# Patient Record
(demographics unavailable — no encounter records)

---

## 2024-12-10 NOTE — PAST MEDICAL HISTORY
[Menstruating] : menstruating [Menarche Age ____] : age at menarche was [unfilled] [Definite ___ (Date)] : the last menstrual period was [unfilled] [Regular Cycle Intervals] : have been regular [Total Preg ___] : G[unfilled] [AB Spont ___] : miscarriages: [unfilled]

## 2024-12-12 NOTE — HISTORY OF PRESENT ILLNESS
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  Concerns 2024: Perimenopause-Menses twice a year, LMP Oct 2024 Feels bloated monthly Last gyn DR Khan Feb 2020-needs f/u Mammo Feb 2020,overdue annual exam Colonosocpy -needs referral-declines and prefers [facility]-normal BMS, no FH colon ca Hx HLD-diet control, BMI stable at25 Hx hypercalcemia in past-on no extra supplements-normal PTH May 2022, to f/u, not taking Vit D reularly  May /2022-has nasal congestion and cough productive of bland phlegm. Thought it may be allerges, did rapid COVID testing, all negative -perimenopause as she still has some hot flashes at night -hx hair loss from scalp for over last 3 years about same -vaginal atrophy/estrogen replacement considered-needs new Gyn  -hx chronic back pain between shoulder blades, LBP with work at end of day-poor posture as on computer -has been more sedentary with covid pandemic -Limited ROM in right shoulder since Feb 2019-no hx trauma -Hx of pain in her arches of feet-using orthotics in her old shoes  (former teacher)  teach teachers how to teach

## 2024-12-12 NOTE — HEALTH RISK ASSESSMENT
[Excellent] : ~his/her~ current health as excellent [Very Good] : ~his/her~  mood as very good [Yes] : Yes [2 - 4 times a month (2 pts)] : 2-4 times a month (2 points) [1 or 2 (0 pts)] : 1 or 2 (0 points) [No falls in past year] : Patient reported no falls in the past year [0] : 2) Feeling down, depressed, or hopeless: Not at all (0) [PHQ-2 Negative - No further assessment needed] : PHQ-2 Negative - No further assessment needed [Patient reported mammogram was normal] : Patient reported mammogram was normal [Patient reported PAP Smear was normal] : Patient reported PAP Smear was normal [HIV test declined] : HIV test declined [Hepatitis C test declined] : Hepatitis C test declined [Employed] : employed [Sexually Active] : sexually active [Patient declined discussion] : Patient declined discussion [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 [de-identified] : wine once every 1-2 weeks [Audit-CScore] : 2 [[id number]] : 0 [Never] : Never [MammogramDate] : 02/20 [MammogramComments] : date?? [PapSmearDate] : 01/14 [PapSmearComments] : date-Gray [BoneDensityComments] : none [ColonoscopyComments] : none [HIVDate] : ? 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

## 2024-12-12 NOTE — PHYSICAL EXAM
[Well Nourished] : well nourished [Well Developed] : well developed [Well-Appearing] : well-appearing [Normal Sclera/Conjunctiva] : normal sclera/conjunctiva [PERRL] : pupils equal round and reactive to light [EOMI] : extraocular movements intact [Normal Outer Ear/Nose] : the outer ears and nose were normal in appearance [Normal Oropharynx] : the oropharynx was normal [No JVD] : no jugular venous distention [No Lymphadenopathy] : no lymphadenopathy [Supple] : supple [Thyroid Normal, No Nodules] : the thyroid was normal and there were no nodules present [No Respiratory Distress] : no respiratory distress  [No Accessory Muscle Use] : no accessory muscle use [Clear to Auscultation] : lungs were clear to auscultation bilaterally [Normal Rate] : normal rate  [Regular Rhythm] : with a regular rhythm [Normal S1, S2] : normal S1 and S2 [No Murmur] : no murmur heard [No Abdominal Bruit] : a ~M bruit was not heard ~T in the abdomen [No Carotid Bruits] : no carotid bruits [No Varicosities] : no varicosities [Pedal Pulses Present] : the pedal pulses are present [No Edema] : there was no peripheral edema [No Palpable Aorta] : no palpable aorta [No Extremity Clubbing/Cyanosis] : no extremity clubbing/cyanosis [Normal Appearance] : normal in appearance [No Axillary Lymphadenopathy] : no axillary lymphadenopathy [Soft] : abdomen soft [Non Tender] : non-tender [Non-distended] : non-distended [No Masses] : no abdominal mass palpated [No HSM] : no HSM [Normal Bowel Sounds] : normal bowel sounds [Normal Posterior Cervical Nodes] : no posterior cervical lymphadenopathy [Normal Anterior Cervical Nodes] : no anterior cervical lymphadenopathy [No CVA Tenderness] : no CVA  tenderness [No Spinal Tenderness] : no spinal tenderness [No Joint Swelling] : no joint swelling [Grossly Normal Strength/Tone] : grossly normal strength/tone [No Rash] : no rash [Coordination Grossly Intact] : coordination grossly intact [No Focal Deficits] : no focal deficits [Normal Gait] : normal gait [Deep Tendon Reflexes (DTR)] : deep tendon reflexes were 2+ and symmetric [Normal Affect] : the affect was normal [Normal Insight/Judgement] : insight and judgment were intact [de-identified] : limited ROM in right shoulder-neg farmer/neer, supraspinatus testing mild

## 2024-12-12 NOTE — PHYSICAL EXAM
[Well Nourished] : well nourished [Well Developed] : well developed [Well-Appearing] : well-appearing [Normal Sclera/Conjunctiva] : normal sclera/conjunctiva [PERRL] : pupils equal round and reactive to light [EOMI] : extraocular movements intact [Normal Outer Ear/Nose] : the outer ears and nose were normal in appearance [Normal Oropharynx] : the oropharynx was normal [No JVD] : no jugular venous distention [No Lymphadenopathy] : no lymphadenopathy [Supple] : supple [Thyroid Normal, No Nodules] : the thyroid was normal and there were no nodules present [No Respiratory Distress] : no respiratory distress  [No Accessory Muscle Use] : no accessory muscle use [Clear to Auscultation] : lungs were clear to auscultation bilaterally [Normal Rate] : normal rate  [Regular Rhythm] : with a regular rhythm [Normal S1, S2] : normal S1 and S2 [No Murmur] : no murmur heard [No Abdominal Bruit] : a ~M bruit was not heard ~T in the abdomen [No Carotid Bruits] : no carotid bruits [No Varicosities] : no varicosities [Pedal Pulses Present] : the pedal pulses are present [No Edema] : there was no peripheral edema [No Palpable Aorta] : no palpable aorta [No Extremity Clubbing/Cyanosis] : no extremity clubbing/cyanosis [Normal Appearance] : normal in appearance [No Axillary Lymphadenopathy] : no axillary lymphadenopathy [Soft] : abdomen soft [Non Tender] : non-tender [Non-distended] : non-distended [No Masses] : no abdominal mass palpated [No HSM] : no HSM [Normal Bowel Sounds] : normal bowel sounds [Normal Posterior Cervical Nodes] : no posterior cervical lymphadenopathy [Normal Anterior Cervical Nodes] : no anterior cervical lymphadenopathy [No CVA Tenderness] : no CVA  tenderness [No Spinal Tenderness] : no spinal tenderness [No Joint Swelling] : no joint swelling [Grossly Normal Strength/Tone] : grossly normal strength/tone [No Rash] : no rash [Coordination Grossly Intact] : coordination grossly intact [No Focal Deficits] : no focal deficits [Normal Gait] : normal gait [Deep Tendon Reflexes (DTR)] : deep tendon reflexes were 2+ and symmetric [Normal Affect] : the affect was normal [Normal Insight/Judgement] : insight and judgment were intact [de-identified] : limited ROM in right shoulder-neg farmer/neer, supraspinatus testing mild

## 2024-12-12 NOTE — HISTORY OF PRESENT ILLNESS
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  Concerns 2024: Perimenopause-Menses twice a year, LMP Oct 2024 Feels bloated monthly Last gyn DR Khan Feb 2020-needs f/u Mammo Feb 2020,overdue annual exam Colonosocpy -needs referral-declines and prefers [facility]-normal BMS, no FH colon ca Hx HLD-diet control, BMI stable at25 Hx hypercalcemia in past-on no extra supplements-normal PTH May 2022, to f/u, not taking Vit D reularly  May /2022-has nasal congestion and cough productive of bland phlegm. Thought it may be allerges, did rapid COVID testing, all negative -perimenopause as she still has some hot flashes at night -hx hair loss from scalp for over last 3 years about same -vaginal atrophy/estrogen replacement considered-needs new Gyn  -hx chronic back pain between shoulder blades, LBP with work at end of day-poor posture as on computer -has been more sedentary with covid pandemic -Limited ROM in right shoulder since Feb 2019-no hx trauma -Hx of pain in her arches of feet-using orthotics in her old shoes  (former teacher)  teach teachers how to teach

## 2024-12-12 NOTE — HEALTH RISK ASSESSMENT
[Excellent] : ~his/her~ current health as excellent [Very Good] : ~his/her~  mood as very good [Yes] : Yes [2 - 4 times a month (2 pts)] : 2-4 times a month (2 points) [1 or 2 (0 pts)] : 1 or 2 (0 points) [No falls in past year] : Patient reported no falls in the past year [0] : 2) Feeling down, depressed, or hopeless: Not at all (0) [PHQ-2 Negative - No further assessment needed] : PHQ-2 Negative - No further assessment needed [Patient reported mammogram was normal] : Patient reported mammogram was normal [Patient reported PAP Smear was normal] : Patient reported PAP Smear was normal [HIV test declined] : HIV test declined [Hepatitis C test declined] : Hepatitis C test declined [Employed] : employed [Sexually Active] : sexually active [Patient declined discussion] : Patient declined discussion [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 [de-identified] : wine once every 1-2 weeks [Audit-CScore] : 2 [[id number]] : 0 [Never] : Never [MammogramDate] : 02/20 [MammogramComments] : date?? [PapSmearDate] : 01/14 [PapSmearComments] : date-Gray [BoneDensityComments] : none [ColonoscopyComments] : none [HIVDate] : ? 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

## 2025-07-15 NOTE — PHYSICAL EXAM
Patient Education     Dental Treatment for Temporomandibular Disorders (TMD)  You have been diagnosed with temporomandibular disorder (TMD). This term describes a group of problems related to the temporomandibular joint (TMJ) and nearby muscles. The TMJ is located where the upper and lower jaws meet. It is part of a structural system that includes the teeth. Because the joint and teeth work together, a problem with your teeth and bite can be linked to TMD. If you grind your teeth or if you have a bad bite, your dentist may be able to help. If your teeth need to be realigned to improve your bite, you may be referred to an orthodontist.  If you grind or clench your teeth    Teeth grinding (bruxism) or clenching strains the TMJ and related muscles. If you have these habits during the day, doing self-checks can help you stop. But it’s hard to control these habits when you’re asleep. That’s when the use of a splint can often help:  · How a splint works. A splint is an appliance that fits in the mouth. It may also be called an orthotic or . There are different kinds of splints for different kinds of needs. A splint can keep the upper and lower teeth apart. This helps protect tooth surfaces from grinding. A splint can also be made to reduce strain on the area.  · Wearing and caring for your splint. To make a splint, your dentist or orthodontist may take impressions of your teeth. Then a splint will be made to fit your mouth. A splint:  ? May be worn during the day or only at night. Be sure to ask when and how often you should wear your splint.  ? Should be cleaned before you put it in and after you take it out. Ask your dentist or orthodontist how to clean the splint.  ? Should be kept in a protective case, away from the reach of children and pets. This helps keep the splint from getting dirty or broken.  If your bite is incorrect  Sometimes the jaws or teeth don’t fit together properly (malocclusion). This can  result in pain and problems with jaw function. If your jaws or teeth are out of alignment, orthodontic treatment may help. If your bad bite is due to missing or damaged teeth, you may receive restorative treatment.  · Restorative treatment. A bad bite can be caused by missing or damaged teeth. A dentist can restore teeth in many ways:  ? A crown is made of ceramic, metal, or porcelain and metal. It is cemented in place to repair a broken or damaged tooth.  ? A bridge is a false tooth fused between 2 crowns.  ? A dental implant is an artificial tooth root. It is attached to the jawbone as a base for an artificial tooth.  · Orthodontic treatment. Sometimes the upper and lower jaws are out of alignment. Or teeth are out of line, turned, crowded, or spaced too far apart. Your orthodontist can align teeth with braces and other devices. This helps provide a more comfortable bite.  If surgery is needed  Surgery is rarely needed for TMD. But if other treatments haven’t worked, you may be referred to an oral and maxillofacial surgeon. Talk with your dentist about whether surgery might be right for you.   Date Last Reviewed: 8/1/2017  © 6266-2593 [employer]. [address] [place] [address], [place] [address]. All rights reserved. This information is not intended as a substitute for professional medical care. Always follow your healthcare professional's instructions.            [Normal] : normal rate, regular rhythm, normal S1 and S2 and no murmur heard [Coordination Grossly Intact] : coordination grossly intact [Normal Gait] : normal gait [Speech Grossly Normal] : speech grossly normal [Memory Grossly Normal] : memory grossly normal [Normal Affect] : the affect was normal [Alert and Oriented x3] : oriented to person, place, and time [Normal Mood] : the mood was normal [Normal Insight/Judgement] : insight and judgment were intact

## 2025-07-15 NOTE — ASSESSMENT
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 Hot flashes for past 2 years, worse in past Not engaging with famiiy/workers and withdraws under stress Parents elderly and live on their own, with her daughters' nanny Daughter is 20, son is 15in  10th  PMH: Hx HLD-diet control, BMI stable at 25 Hx hypercalcemia in past-on no extra supplements-normal PTH May 2022, to f/u, not taking Vit D regularly -hx hair loss from scalp for over last 5 years about same -vaginal atrophy/estrogen replacement considered-needs new Gyn  -hx chronic back pain between shoulder blades, LBP with work at end of day-poor posture as on computer -has been more sedentary with covid pandemic -Limited ROM in right shoulder since Feb 2019-no hx trauma -Hx of pain in her arches of feet-using orthotics in her old shoes  (former teacher)  teach teachers how to teach

## 2025-07-15 NOTE — HISTORY OF PRESENT ILLNESS
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 Hot flashes for past 2 years, worse in past Not engaging with famiiy/workers and withdraws under stress Parents elderly and live on their own, with her daughters' nanny Daughter is 20, son is 15in  10th  PMH: Hx HLD-diet control, BMI stable at25 Hx hypercalcemia in past-on no extra supplements-normal PTH May 2022, to f/u, not taking Vit D regularly -hx hair loss from scalp for over last 5 years about same -vaginal atrophy/estrogen replacement considered-needs new Gyn  -hx chronic back pain between shoulder blades, LBP with work at end of day-poor posture as on computer -has been more sedentary with covid pandemic -Limited ROM in right shoulder since Feb 2019-no hx trauma -Hx of pain in her arches of feet-using orthotics in her old shoes  (former teacher)  teach teachers how to teach

## 2025-07-15 NOTE — END OF VISIT
[Time Spent: ___ minutes] : I have spent [unfilled] minutes of time on the encounter which excludes teaching and separately reported services. Modified Independent

## 2025-07-15 NOTE — ASSESSMENT
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 Hot flashes for past 2 years, worse in past Not engaging with famiiy/workers and withdraws under stress Parents elderly and live on their own, with her daughters' nanny Daughter is 20, son is 15in  10th  PMH: Hx HLD-diet control, BMI stable at 25 Hx hypercalcemia in past-on no extra supplements-normal PTH May 2022, to f/u, not taking Vit D regularly -hx hair loss from scalp for over last 5 years about same -vaginal atrophy/estrogen replacement considered-needs new Gyn  -hx chronic back pain between shoulder blades, LBP with work at end of day-poor posture as on computer -has been more sedentary with covid pandemic -Limited ROM in right shoulder since Feb 2019-no hx trauma -Hx of pain in her arches of feet-using orthotics in her old shoes  (former teacher)  teach teachers how to teach

## 2025-07-15 NOTE — HISTORY OF PRESENT ILLNESS
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 Hot flashes for past 2 years, worse in past Not engaging with famiiy/workers and withdraws under stress Parents elderly and live on their own, with her daughters' nanny Daughter is 20, son is 15in  10th  PMH: Hx HLD-diet control, BMI stable at25 Hx hypercalcemia in past-on no extra supplements-normal PTH May 2022, to f/u, not taking Vit D regularly -hx hair loss from scalp for over last 5 years about same -vaginal atrophy/estrogen replacement considered-needs new Gyn  -hx chronic back pain between shoulder blades, LBP with work at end of day-poor posture as on computer -has been more sedentary with covid pandemic -Limited ROM in right shoulder since Feb 2019-no hx trauma -Hx of pain in her arches of feet-using orthotics in her old shoes  (former teacher)  teach teachers how to teach

## 2025-07-15 NOTE — HEALTH RISK ASSESSMENT
[Yes] : Yes [2 - 4 times a month (2 pts)] : 2-4 times a month (2 points) [1 or 2 (0 pts)] : 1 or 2 (0 points) [No falls in past year] : Patient reported no falls in the past year [0] : 2) Feeling down, depressed, or hopeless: Not at all (0) [PHQ-2 Negative - No further assessment needed] : PHQ-2 Negative - No further assessment needed [Time Spent: ___ Minutes] : I spent [unfilled] minutes performing a depression screening for this patient. [1] : 1) Little interest or pleasure doing things for several days (1) [2] : 2) Feeling down, depressed, or hopeless for more than half of the days (2) [PHQ-2 Positive] : PHQ-2 Positive [1/2 of Days or More (2)] : 3.) Trouble falling asleep, or sleeping too much? Half the days or more [Several Days (1)] : 6.) Feeling bad about yourself, or that you are a failure, or have let yourself or your family down? Several days [Nearly Every Day (3)] : 7.) Trouble concentrating on things, such as reading a newspaper or watching television? Nearly every day [Not at All (0)] : 9.) Thoughts that you would be off dead or of hurting yourself in some way? Not at all [Moderate] : Severity of Depression is Moderate [PHQ-9 Positive] : PHQ-9 Positive [I have developed a follow-up plan documented below in the note.] : I have developed a follow-up plan documented below in the note. [de-identified] : wine once every 1-2 weeks [Audit-CScore] : 2 [[id number]] : 0

## 2025-07-15 NOTE — HEALTH RISK ASSESSMENT
[Yes] : Yes [2 - 4 times a month (2 pts)] : 2-4 times a month (2 points) [1 or 2 (0 pts)] : 1 or 2 (0 points) [No falls in past year] : Patient reported no falls in the past year [0] : 2) Feeling down, depressed, or hopeless: Not at all (0) [PHQ-2 Negative - No further assessment needed] : PHQ-2 Negative - No further assessment needed [Time Spent: ___ Minutes] : I spent [unfilled] minutes performing a depression screening for this patient. [1] : 1) Little interest or pleasure doing things for several days (1) [2] : 2) Feeling down, depressed, or hopeless for more than half of the days (2) [PHQ-2 Positive] : PHQ-2 Positive [1/2 of Days or More (2)] : 3.) Trouble falling asleep, or sleeping too much? Half the days or more [Several Days (1)] : 6.) Feeling bad about yourself, or that you are a failure, or have let yourself or your family down? Several days [Nearly Every Day (3)] : 7.) Trouble concentrating on things, such as reading a newspaper or watching television? Nearly every day [Not at All (0)] : 9.) Thoughts that you would be off dead or of hurting yourself in some way? Not at all [Moderate] : Severity of Depression is Moderate [PHQ-9 Positive] : PHQ-9 Positive [I have developed a follow-up plan documented below in the note.] : I have developed a follow-up plan documented below in the note. [de-identified] : wine once every 1-2 weeks [Audit-CScore] : 2 [[id number]] : 0

## 2025-07-22 NOTE — HISTORY OF PRESENT ILLNESS
[Home] : at home, [unfilled] , at the time of the visit. [Medical Office: ([facility])___] : at the medical office located in  [Telehealth (audio & video)] : This visit was provided via telehealth using real-time 2-way audio visual technology. [Verbal consent obtained from patient] : the patient, [unfilled] [Other Location: e.g. School (Enter Location, City,State)___] : at [unfilled], at the time of the visit. [This encounter was initiated by telehealth (audio with video) and converted to telephone (audio only)] : This encounter was initiated by telehealth (audio with video) and converted to telephone (audio only) [Technical] : patient unable to effectively utilize tele-video due to technical issues. [de-identified] : Call switch to TTM is late for call and could not log back on to the visual screen' Here to follow-up how she is feeling after beginning Lexapro/escitalopram 5 mg daily.  Patient reports that she is actually feeling more upbeat and is starting to see "the cup as being helpful breath and always have empty nose ". She is tolerating the escitalopram without any GI side effects, reports sleep is is okay and is willing to increase the escitalopram dose from  5 mg to to 10 mg. Advised patient that we can cannot reach out again on a telehealth call but will also contact our behavioral health team Jo Galicia to help monitor patient while she is on the SSRI and to increase/or keep the same dose based on how she is doing.. Patient in agreement with all the above and will call to follow-up earlier if needed.

## 2025-07-22 NOTE — ASSESSMENT
[FreeTextEntry1] : Call switch to TTM is late for call and could not log back on to the visual screen' Here to follow-up how she is feeling after beginning Lexapro/escitalopram 5 mg daily.  Patient reports that she is actually feeling more upbeat and is starting to see "the cup as being helpful breath and always have empty nose ". She is tolerating the escitalopram without any GI side effects, reports sleep is is okay and is willing to increase the escitalopram dose from  5 mg to to 10 mg. Advised patient that we can cannot reach out again on a telehealth call but will also contact our behavioral health team Jo Galicia to help monitor patient while she is on the SSRI and to increase/or keep the same dose based on how she is doing.. Patient in agreement with all the above and will call to follow-up earlier if needed.

## 2025-07-22 NOTE — HISTORY OF PRESENT ILLNESS
[Home] : at home, [unfilled] , at the time of the visit. [Medical Office: ([facility])___] : at the medical office located in  [Telehealth (audio & video)] : This visit was provided via telehealth using real-time 2-way audio visual technology. [Verbal consent obtained from patient] : the patient, [unfilled] [Other Location: e.g. School (Enter Location, City,State)___] : at [unfilled], at the time of the visit. [This encounter was initiated by telehealth (audio with video) and converted to telephone (audio only)] : This encounter was initiated by telehealth (audio with video) and converted to telephone (audio only) [Technical] : patient unable to effectively utilize tele-video due to technical issues. [de-identified] : Call switch to TTM is late for call and could not log back on to the visual screen' Here to follow-up how she is feeling after beginning Lexapro/escitalopram 5 mg daily.  Patient reports that she is actually feeling more upbeat and is starting to see "the cup as being helpful breath and always have empty nose ". She is tolerating the escitalopram without any GI side effects, reports sleep is is okay and is willing to increase the escitalopram dose from  5 mg to to 10 mg. Advised patient that we can cannot reach out again on a telehealth call but will also contact our behavioral health team Jo Galicia to help monitor patient while she is on the SSRI and to increase/or keep the same dose based on how she is doing.. Patient in agreement with all the above and will call to follow-up earlier if needed.